# Patient Record
Sex: FEMALE | Race: WHITE | NOT HISPANIC OR LATINO | Employment: STUDENT | URBAN - METROPOLITAN AREA
[De-identification: names, ages, dates, MRNs, and addresses within clinical notes are randomized per-mention and may not be internally consistent; named-entity substitution may affect disease eponyms.]

---
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
## 2022-04-10 ENCOUNTER — HISTORICAL (OUTPATIENT)
Dept: ADMINISTRATIVE | Facility: HOSPITAL | Age: 22
End: 2022-04-10

## 2022-04-24 VITALS
HEIGHT: 66 IN | DIASTOLIC BLOOD PRESSURE: 70 MMHG | BODY MASS INDEX: 19.74 KG/M2 | WEIGHT: 122.81 LBS | OXYGEN SATURATION: 100 % | SYSTOLIC BLOOD PRESSURE: 110 MMHG

## 2022-06-27 PROBLEM — Z00.00 WELLNESS EXAMINATION: Status: ACTIVE | Noted: 2022-06-27

## 2022-09-26 PROBLEM — Z00.00 WELLNESS EXAMINATION: Status: RESOLVED | Noted: 2022-06-27 | Resolved: 2022-09-26

## 2023-01-05 PROBLEM — J40 BRONCHITIS: Status: ACTIVE | Noted: 2023-01-05

## 2023-09-06 ENCOUNTER — OFFICE VISIT (OUTPATIENT)
Dept: URGENT CARE | Facility: CLINIC | Age: 23
End: 2023-09-06
Payer: COMMERCIAL

## 2023-09-06 VITALS
TEMPERATURE: 98 F | WEIGHT: 132 LBS | HEART RATE: 76 BPM | HEIGHT: 66 IN | BODY MASS INDEX: 21.21 KG/M2 | DIASTOLIC BLOOD PRESSURE: 84 MMHG | OXYGEN SATURATION: 100 % | SYSTOLIC BLOOD PRESSURE: 127 MMHG | RESPIRATION RATE: 16 BRPM

## 2023-09-06 DIAGNOSIS — R11.2 NAUSEA AND VOMITING, UNSPECIFIED VOMITING TYPE: Primary | ICD-10-CM

## 2023-09-06 PROCEDURE — 99202 PR OFFICE/OUTPT VISIT, NEW, LEVL II, 15-29 MIN: ICD-10-PCS | Mod: ,,, | Performed by: FAMILY MEDICINE

## 2023-09-06 PROCEDURE — 99202 OFFICE O/P NEW SF 15 MIN: CPT | Mod: ,,, | Performed by: FAMILY MEDICINE

## 2023-09-06 NOTE — LETTER
September 6, 2023      [facility] [place] [address]  [place] [address]  Phone: [phone]       Patient: Angeles Elias   YOB: 2000  Date of Visit: 09/06/2023    To Whom It May Concern:    Yuval Elias  was at [facility] on 09/06/2023. Please excuse her for 9/6/23.The patient may return to work/school on 9/7/2023 with no restrictions. If you have any questions or concerns, or if I can be of further assistance, please do not hesitate to contact me.    Sincerely,    Hanane Rodriguez MD

## 2023-09-06 NOTE — PROGRESS NOTES
"Subjective:      Patient ID: Angeles Elias is a 22 y.o. female.    Vitals:  height is 5' 6" (1.676 m) and weight is 59.9 kg (132 lb). Her temperature is 98.4 °F (36.9 °C). Her blood pressure is 127/84 and her pulse is 76. Her respiration is 16 and oxygen saturation is 100%.     Chief Complaint: Emesis (X last night- vomiting x 2, nausea - feels completely better just needing excuse for today )    HPI:  22-year-old female present to clinic with concerns of nausea and vomiting last night, no diarrhea, no abdominal pain.  No fever.  Denies eating anything different or unusual.  Symptoms much improved since morning.  Requesting for excuse today    ROS :  Constitutional : Negative for fever, Negative for weakness  HEENT : No sore throat,No earache  Neck : Negative except HPI  Respiratory : Negative for shortness of breath and wheezing  Cardiovascular : Negative for chest pain, no palpitations   Gastrointestinal : Negative except as documented in HPI  Integumentary : Negative for skin rash  Neurological : Negative except HPI   Objective:     Physical Exam  General : Alert and Oriented, No apparent distress, afebrile  Neck - supple  HENT : Oropharynx no redness or swelling.  And moist   Respiratory : Bilateral equal breath sounds, nonlabored respirations  Cardiovascular : Rate, rhythm regular, normal volume pulse, no murmur  Gastrointestinal: Full abdomen, soft, nontender to palpate  Integumentary : Warm, Dry     Assessment:     1. Nausea and vomiting, unspecified vomiting type      Plan:   Adequate hydration, forced fluids more like Gatorade and Powerade  With improving symptoms monitor for now, for nausea and vomiting call clinic will consider Zofran  Soft bland diet : BRAT Diet: Grits, Soup, Toast, Applesauce and Rice Cereal  Avoid spicy food, Avoid fried food ,soda drinks and caffeine drinks  Call or return to clinic for any questions. Follow-up with primary M.D.   Excuse for today    Nausea and vomiting, unspecified " vomiting type

## 2023-09-06 NOTE — PATIENT INSTRUCTIONS
Adequate hydration, forced fluids more like Gatorade and Powerade  With improving symptoms monitor for now, for nausea and vomiting call clinic will consider Zofran  Soft bland diet : BRAT Diet: Grits, Soup, Toast, Applesauce and Rice Cereal  Avoid spicy food, Avoid fried food ,soda drinks and caffeine drinks  Call or return to clinic for any questions. Follow-up with primary M.OLIVERIO.   Excuse for today

## 2024-07-08 ENCOUNTER — CLINICAL SUPPORT (OUTPATIENT)
Dept: URGENT CARE | Facility: CLINIC | Age: 24
End: 2024-07-08
Payer: COMMERCIAL

## 2024-07-08 VITALS — HEIGHT: 66 IN | BODY MASS INDEX: 21.69 KG/M2 | WEIGHT: 135 LBS | RESPIRATION RATE: 16 BRPM

## 2024-07-08 DIAGNOSIS — Z11.1 ENCOUNTER FOR PPD TEST: Primary | ICD-10-CM

## 2024-07-08 NOTE — PROGRESS NOTES
"Subjective:      Patient ID: Angeles Elias is a 23 y.o. female.    Vitals:  height is 5' 6" (1.676 m) and weight is 61.2 kg (135 lb). Her respiration is 16.     Chief Complaint: PPD Placement    Patient presents to the clinic to have PPD test administered. Administered as documented. Patient tolerated well. Patient was instructed to return in 48-72 hours to have PPD test read.   ROS   Objective:     Physical Exam    Assessment:     1. Encounter for PPD test        Plan:       Encounter for PPD test  -     POCT TB Skin Test Read                    "

## 2024-07-10 ENCOUNTER — CLINICAL SUPPORT (OUTPATIENT)
Dept: URGENT CARE | Facility: CLINIC | Age: 24
End: 2024-07-10
Payer: COMMERCIAL

## 2024-07-10 ENCOUNTER — OFFICE VISIT (OUTPATIENT)
Dept: URGENT CARE | Facility: CLINIC | Age: 24
End: 2024-07-10
Payer: COMMERCIAL

## 2024-07-10 DIAGNOSIS — Z53.20 PROCEDURE NOT CARRIED OUT BECAUSE OF PATIENT'S DECISION: Primary | ICD-10-CM

## 2024-07-10 DIAGNOSIS — Z11.1 ENCOUNTER FOR PPD SKIN TEST READING: Primary | ICD-10-CM

## 2024-07-10 LAB
TB INDURATION - 48 HR READ: 0 MM
TB INDURATION - 72 HR READ: NORMAL
TB SKIN TEST - 48 HR READ: NEGATIVE
TB SKIN TEST - 72 HR READ: NORMAL

## 2024-07-10 PROCEDURE — 99499 UNLISTED E&M SERVICE: CPT | Mod: ,,, | Performed by: FAMILY MEDICINE

## 2024-07-10 NOTE — LETTER
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 The results of this test are as follows:    PPD Test Value         7/10/2024         Tb Skin Test Negative    TB Induration(mm) 0          Current PPD Immunization       Name Date Dose VIS Date Route    PPD Test 7/8/2024 0.1 mL N/A Intradermal    Site: Right arm    Given By: [place], RT    : Other     Lot: [id number]    Expiration Date: 12/31/2025    PPD Test 6/13/2023 0.1 mL N/A Intradermal    Site: Left arm    Given By: Prasad Chavira RN    : Other     Lot: [id number]    Expiration Date: 5/1/2024    PPD Test 7/11/2022 0.1 mL N/A Intradermal    Site: Right arm    Given By: Prasad Chavira RN    : Other     Lot: [id number]    Expiration Date: 11/1/2023    PPD Test 6/29/2022 0.1 mL N/A Intradermal    Site: Left arm    Given By: Prasad Chavira RN    : [employer]    Lot: [id number]    Expiration Date: 11/1/2023    PPD Test 6/27/2022 5 Units N/A Intradermal    Site: Left arm    Given By: Anahi Arellano RN    : Other     Lot: [id number]    Expiration Date: 11/1/2023            This test was negative for tuberculosis exposure per current Centers for Disease Control guidelines.    A chest x-ray was not required.    If you have any questions or concerns, please don't hesitate to call.    Sincerely,        NURSE URGENT CARE, [facility]

## 2024-07-10 NOTE — LETTER
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 The results of this test are as follows:    PPD Test Value         7/10/2024         Tb Skin Test Negative    TB Induration(mm) 0          This test was negative for tuberculosis exposure per current Centers for Disease Control guidelines.    A chest x-ray was not required.    If you have any questions or concerns, please don't hesitate to call.    Sincerely,        NURSE URGENT CARE, [facility]

## 2024-07-10 NOTE — PROGRESS NOTES
PPD Reading Note  PPD read and results entered in [facility].  Result: 0 mm induration.  Interpretation: negative

## 2024-07-10 NOTE — PROGRESS NOTES
This encounter was created in error - please disregard.  The patient left the office before the visit was finished.

## 2024-08-13 ENCOUNTER — TELEPHONE (OUTPATIENT)
Dept: PRIMARY CARE CLINIC | Facility: CLINIC | Age: 24
End: 2024-08-13
Payer: COMMERCIAL

## 2024-08-13 DIAGNOSIS — Z00.00 WELLNESS EXAMINATION: Primary | ICD-10-CM

## 2024-08-13 NOTE — TELEPHONE ENCOUNTER
----- Message from Jennie Baker sent at 8/13/2024  9:34 AM CDT -----  Who Called: Angeles Elias    What order is the patient requesting:  lab   When does the expect the orders to be performed? ortho  Preferred Method of Contact: Phone Call  Patient's Preferred Phone Number on File: [phone]   Best Call Back Number, if different:  Additional Information:  please place lab orders in epic, thanks

## 2024-08-14 ENCOUNTER — LAB VISIT (OUTPATIENT)
Dept: LAB | Facility: HOSPITAL | Age: 24
End: 2024-08-14
Attending: FAMILY MEDICINE
Payer: COMMERCIAL

## 2024-08-14 DIAGNOSIS — Z00.00 WELLNESS EXAMINATION: ICD-10-CM

## 2024-08-14 LAB
BACTERIA #/AREA URNS AUTO: ABNORMAL /HPF
BILIRUB UR QL STRIP.AUTO: NEGATIVE
CLARITY UR: CLEAR
COLOR UR AUTO: ABNORMAL
GLUCOSE UR QL STRIP: NORMAL
HGB UR QL STRIP: ABNORMAL
KETONES UR QL STRIP: NEGATIVE
LEUKOCYTE ESTERASE UR QL STRIP: 75
MUCOUS THREADS URNS QL MICRO: ABNORMAL /LPF
NITRITE UR QL STRIP: NEGATIVE
PH UR STRIP: 6 [PH]
PROT UR QL STRIP: NEGATIVE
RBC #/AREA URNS AUTO: ABNORMAL /HPF
SP GR UR STRIP.AUTO: 1.02 (ref 1–1.03)
SQUAMOUS #/AREA URNS LPF: ABNORMAL /HPF
UROBILINOGEN UR STRIP-ACNC: NORMAL
WBC #/AREA URNS AUTO: ABNORMAL /HPF

## 2024-08-14 PROCEDURE — 85025 COMPLETE CBC W/AUTO DIFF WBC: CPT

## 2024-08-14 PROCEDURE — 80053 COMPREHEN METABOLIC PANEL: CPT

## 2024-08-14 PROCEDURE — 81015 MICROSCOPIC EXAM OF URINE: CPT

## 2024-08-14 PROCEDURE — 80061 LIPID PANEL: CPT

## 2024-08-14 PROCEDURE — 81001 URINALYSIS AUTO W/SCOPE: CPT

## 2024-08-14 PROCEDURE — 36415 COLL VENOUS BLD VENIPUNCTURE: CPT

## 2024-08-18 NOTE — PROGRESS NOTES
..Annual Exam       HISTORY OF PRESENT ILLNESS    This 23 y.o. female patient presents to the clinic today for a wellness CPX.  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  She has been feeling well.    She has been sleeping well.    Her appetite is good.  She does not smoke.    She has alcohol occasionally.  She has a cup of coffee daily. No soft drinks.  She walks 4 miles every morning.   Gyn: Eli Landry NP; last checkup 12/22/2023.        The patient's Health Maintenance was reviewed and the following appears to be due at this time:   Health Maintenance Due   Topic Date Due    Hepatitis C Screening  Never done    HIV Screening  Never done    HPV Vaccines (1 - 3-dose series) Never done    Chlamydia Screening  11/01/2022    COVID-19 Vaccine (1 - 2023-24 season) Never done       ..  Past Medical History:   Diagnosis Date    No known health problems           ..  Past Surgical History:   Procedure Laterality Date    ADENOIDECTOMY      Blocked tear duct      EYE SURGERY      TONSILLECTOMY      TONSILLECTOMY AND ADENOIDECTOMY            Current Outpatient Medications   Medication Instructions    ENSKYCE 0.15-0.03 mg per tablet 1 tablet, Oral, Daily         ..  Social History     Socioeconomic History    Marital status: Single    Number of children: 0   Occupational History    Occupation: student   Tobacco Use    Smoking status: Never     Passive exposure: Never    Smokeless tobacco: Never   Substance and Sexual Activity    Alcohol use: Not Currently     Comment: occasional    Drug use: Never    Sexual activity: Yes     Partners: Male     Birth control/protection: Abstinence, Other-see comments     Comment: Pill     Social Determinants of Health     Financial Resource Strain: Low Risk  (8/18/2024)    Overall Financial Resource Strain (CARDIA)     Difficulty of Paying Living Expenses: Not very hard   Food Insecurity: No Food Insecurity (8/18/2024)    Hunger Vital Sign     Worried About  Running Out of Food in the Last Year: Never true     Ran Out of Food in the Last Year: Never true   Transportation Needs: No Transportation Needs (8/21/2024)    TRANSPORTATION NEEDS     Transportation : No   Physical Activity: Sufficiently Active (8/18/2024)    Exercise Vital Sign     Days of Exercise per Week: 3 days     Minutes of Exercise per Session: 60 min   Stress: No Stress Concern Present (8/18/2024)    [employer] [place] [employer] - Occupational Stress Questionnaire     Feeling of Stress : Not at all   Housing Stability: Unknown (8/18/2024)    Housing Stability Vital Sign     Unable to Pay for Housing in the Last Year: No          ..  Family History   Problem Relation Name Age of Onset    No Known Problems Mother      Hypertension Father John Elias     Thyroid nodules Sister      No Known Problems Sister      No Known Problems Brother            ..Review of patient's allergies indicates:  No Known Allergies       ..  Immunization History   Administered Date(s) Administered    DTaP 02/02/2002, 04/27/2002, 07/12/2002, 08/28/2003, 08/07/2006    HIB 07/12/2002    Hep B / HiB 02/02/2002, 04/27/2002, 08/28/2003    IPV 02/02/2002, 04/27/2002, 08/28/2003, 08/07/2006    Influenza - Quadrivalent - PF *Preferred* (6 months and older) 10/09/2018    MMR 08/28/2003, 08/07/2006    Meningococcal Conjugate 07/07/2020    Meningococcal Conjugate (MCV4O) 2 Vial (2mo-55yr) 08/16/2013    Meningococcal Conjugate (MCV4P) 07/07/2020    PPD Test 06/27/2022, 06/29/2022, 07/11/2022, 06/13/2023, 07/08/2024    Pneumococcal Conjugate - 7 Valent 02/02/2002, 04/27/2002, 07/12/2002    Tdap 08/16/2013, 10/08/2019    Varicella 08/28/2003, 08/09/2012          REVIEW OF SYSTEMS:    GENERAL:   no unexplained wt gain/loss,  fever, fatigue, chills, night sweats or weakness  HEENT: no sore throat, ear pain, sinus pressure, nasal congestion, or rhinorrhea  VISION: no vision changes, glaucoma, cataracts  CARDIAC: no chest pain,  "palpitations, dyspnea on exertion, orthopnea  RESPIRATORY: no cough, wheezing, sputum production, or SOB  GI: no abdominal pain, n&v, constipation, diarrhea,  blood in stool or  (--)family history of colon cancer    : no dysuria, hematuria, frequency,  urgency, incontinence,  vaginal discharge,  abnormal vaginal bleeding, cycles are regular; LMP 7/28/2024  MUSC/SKEL:  no myalgia, weakness, edema,  arthralgia, or joint effusion  SKIN:  No rash, hives, itching or sores  NEURO:  No headaches, numbness,  tingling, weakness, or dizziness  PSYCH:  No anxiety,  depression,  irritability,  suicidal ideation or hallucinations  ENDO:  No polyuria, polydipsia,  polyphagia  HEME:  No bruising,  lymphadenopathy, bleeding disorders, no anemia       PHYSICAL EXAM:    Visit Vitals  /77   Pulse 60   Temp 99.2 °F (37.3 °C)   Ht 5' 6" (1.676 m)   Wt 63.1 kg (139 lb 3.2 oz)   LMP 07/28/2024 (Exact Date)   SpO2 98%   BMI 22.47 kg/m²       GENERAL:  Well-developed well-nourished white female in NAD, alert and oriented x 3  SKIN:  no rash or abnormal appearing skin lesions  HEENT:  PERRLA, EOMI, mouth wnl, throat wnl, EAC and TM wnl bilaterally  NECK:  FROM, no lymphadenopathy, no thyroid abnormalities palpable  CHEST:  CTA bilaterally no wheezes, crackles or rubs  CARDIAC:  RRR, no murmurs audible  ABDOMEN:  Soft, nontender, nondistended, NBSx4, no rebound or guarding, no HSM  EXTREMITIES:  no clubbing, cyanosis, or edema.  joints wnl. +2 DP/PT pulse bilaterally  NEURO:  no sensory or motor deficits noted. CN II-XII intact. Gait wnl.           ASSESSMENT AND PLAN     1. Wellness examination  Overview:  Encourage pt to increase cardiovascular exercise and attempt to obtain at least 150 minutes of moderate aerobic exercise per week or 75 minutes of vigorous aerobic exercise weekly.   Weight bearing activities encouraged to increase bone density.     08/21/2024:   Patient presents for wellness examination.    She has been feeling " well.    Patient encouraged to continue to exercise regularly.    Patient encouraged to follow a healthy diet.    Gyn: Eli Landry NP for Dr Jase Leal.  Labs pending.           ..Follow up in about 1 year (around 8/21/2025) for Wellness.     Future Appointments   Date Time Provider Department Center   8/28/2025  8:00 AM Domingo Campo MD [facility]

## 2024-08-21 ENCOUNTER — OFFICE VISIT (OUTPATIENT)
Dept: PRIMARY CARE CLINIC | Facility: CLINIC | Age: 24
End: 2024-08-21
Payer: COMMERCIAL

## 2024-08-21 VITALS
SYSTOLIC BLOOD PRESSURE: 127 MMHG | BODY MASS INDEX: 22.37 KG/M2 | HEART RATE: 60 BPM | HEIGHT: 66 IN | TEMPERATURE: 99 F | WEIGHT: 139.19 LBS | DIASTOLIC BLOOD PRESSURE: 77 MMHG | OXYGEN SATURATION: 98 %

## 2024-08-21 DIAGNOSIS — Z00.00 WELLNESS EXAMINATION: Primary | ICD-10-CM

## 2024-08-21 LAB
ALBUMIN SERPL-MCNC: 3.7 G/DL (ref 3.5–5)
ALBUMIN/GLOB SERPL: 1.1 RATIO (ref 1.1–2)
ALP SERPL-CCNC: 43 UNIT/L (ref 40–150)
ALT SERPL-CCNC: 13 UNIT/L (ref 0–55)
ANION GAP SERPL CALC-SCNC: 7 MEQ/L
AST SERPL-CCNC: 14 UNIT/L (ref 5–34)
BASOPHILS # BLD AUTO: 0.03 X10(3)/MCL
BASOPHILS NFR BLD AUTO: 0.6 %
BILIRUB SERPL-MCNC: 0.5 MG/DL
BUN SERPL-MCNC: 12.9 MG/DL (ref 7–18.7)
CALCIUM SERPL-MCNC: 9.3 MG/DL (ref 8.4–10.2)
CHLORIDE SERPL-SCNC: 109 MMOL/L (ref 98–107)
CHOLEST SERPL-MCNC: 157 MG/DL
CHOLEST/HDLC SERPL: 3 {RATIO} (ref 0–5)
CO2 SERPL-SCNC: 23 MMOL/L (ref 22–29)
CREAT SERPL-MCNC: 0.8 MG/DL (ref 0.55–1.02)
CREAT/UREA NIT SERPL: 16
EOSINOPHIL # BLD AUTO: 0.14 X10(3)/MCL (ref 0–0.9)
EOSINOPHIL NFR BLD AUTO: 2.9 %
ERYTHROCYTE [DISTWIDTH] IN BLOOD BY AUTOMATED COUNT: 12.5 % (ref 11.5–17)
GFR SERPLBLD CREATININE-BSD FMLA CKD-EPI: >60 ML/MIN/1.73/M2
GLOBULIN SER-MCNC: 3.4 GM/DL (ref 2.4–3.5)
GLUCOSE SERPL-MCNC: 93 MG/DL (ref 74–100)
HCT VFR BLD AUTO: 35.9 % (ref 37–47)
HDLC SERPL-MCNC: 61 MG/DL (ref 35–60)
HGB BLD-MCNC: 11.9 G/DL (ref 12–16)
IMM GRANULOCYTES # BLD AUTO: 0.02 X10(3)/MCL (ref 0–0.04)
IMM GRANULOCYTES NFR BLD AUTO: 0.4 %
LDLC SERPL CALC-MCNC: 81 MG/DL (ref 50–140)
LYMPHOCYTES # BLD AUTO: 1.42 X10(3)/MCL (ref 0.6–4.6)
LYMPHOCYTES NFR BLD AUTO: 29.3 %
MCH RBC QN AUTO: 29.8 PG (ref 27–31)
MCHC RBC AUTO-ENTMCNC: 33.1 G/DL (ref 33–36)
MCV RBC AUTO: 90 FL (ref 80–94)
MONOCYTES # BLD AUTO: 0.47 X10(3)/MCL (ref 0.1–1.3)
MONOCYTES NFR BLD AUTO: 9.7 %
NEUTROPHILS # BLD AUTO: 2.76 X10(3)/MCL (ref 2.1–9.2)
NEUTROPHILS NFR BLD AUTO: 57.1 %
NRBC BLD AUTO-RTO: 0 %
PLATELET # BLD AUTO: 236 X10(3)/MCL (ref 130–400)
PLATELETS.RETICULATED NFR BLD AUTO: 3 % (ref 0.9–11.2)
PMV BLD AUTO: 10.1 FL (ref 7.4–10.4)
POTASSIUM SERPL-SCNC: 3.9 MMOL/L (ref 3.5–5.1)
PROT SERPL-MCNC: 7.1 GM/DL (ref 6.4–8.3)
RBC # BLD AUTO: 3.99 X10(6)/MCL (ref 4.2–5.4)
SODIUM SERPL-SCNC: 139 MMOL/L (ref 136–145)
TRIGL SERPL-MCNC: 77 MG/DL (ref 37–140)
VLDLC SERPL CALC-MCNC: 15 MG/DL
WBC # BLD AUTO: 4.84 X10(3)/MCL (ref 4.5–11.5)

## 2024-08-21 PROCEDURE — 1159F MED LIST DOCD IN RCRD: CPT | Mod: CPTII,,, | Performed by: FAMILY MEDICINE

## 2024-08-21 PROCEDURE — 99395 PREV VISIT EST AGE 18-39: CPT | Mod: ,,, | Performed by: FAMILY MEDICINE

## 2024-08-21 PROCEDURE — 3008F BODY MASS INDEX DOCD: CPT | Mod: CPTII,,, | Performed by: FAMILY MEDICINE

## 2024-08-21 PROCEDURE — 1160F RVW MEDS BY RX/DR IN RCRD: CPT | Mod: CPTII,,, | Performed by: FAMILY MEDICINE

## 2024-08-21 PROCEDURE — 3078F DIAST BP <80 MM HG: CPT | Mod: CPTII,,, | Performed by: FAMILY MEDICINE

## 2024-08-21 PROCEDURE — 3074F SYST BP LT 130 MM HG: CPT | Mod: CPTII,,, | Performed by: FAMILY MEDICINE

## 2025-08-26 ENCOUNTER — LAB VISIT (OUTPATIENT)
Dept: LAB | Facility: HOSPITAL | Age: 25
End: 2025-08-26
Attending: FAMILY MEDICINE
Payer: COMMERCIAL

## 2025-08-26 ENCOUNTER — TELEPHONE (OUTPATIENT)
Dept: PRIMARY CARE CLINIC | Facility: CLINIC | Age: 25
End: 2025-08-26
Payer: COMMERCIAL

## 2025-08-26 DIAGNOSIS — Z00.00 WELLNESS EXAMINATION: Primary | ICD-10-CM

## 2025-08-28 ENCOUNTER — OFFICE VISIT (OUTPATIENT)
Dept: PRIMARY CARE CLINIC | Facility: CLINIC | Age: 25
End: 2025-08-28
Payer: COMMERCIAL

## 2025-08-28 VITALS
HEIGHT: 66 IN | OXYGEN SATURATION: 98 % | HEART RATE: 60 BPM | DIASTOLIC BLOOD PRESSURE: 71 MMHG | BODY MASS INDEX: 22.42 KG/M2 | WEIGHT: 139.5 LBS | SYSTOLIC BLOOD PRESSURE: 121 MMHG | TEMPERATURE: 98 F

## 2025-08-28 DIAGNOSIS — Z00.00 ENCOUNTER FOR WELLNESS EXAMINATION IN ADULT: Primary | ICD-10-CM
